# Patient Record
Sex: FEMALE | Race: WHITE | NOT HISPANIC OR LATINO | Employment: OTHER | URBAN - METROPOLITAN AREA
[De-identification: names, ages, dates, MRNs, and addresses within clinical notes are randomized per-mention and may not be internally consistent; named-entity substitution may affect disease eponyms.]

---

## 2021-05-31 ENCOUNTER — RECORDS - HEALTHEAST (OUTPATIENT)
Dept: ADMINISTRATIVE | Facility: CLINIC | Age: 86
End: 2021-05-31

## 2021-06-01 ENCOUNTER — RECORDS - HEALTHEAST (OUTPATIENT)
Dept: ADMINISTRATIVE | Facility: CLINIC | Age: 86
End: 2021-06-01

## 2022-05-16 ENCOUNTER — TRANSCRIBE ORDERS (OUTPATIENT)
Dept: OTHER | Age: 87
End: 2022-05-16
Payer: COMMERCIAL

## 2022-05-16 DIAGNOSIS — R13.19 ESOPHAGEAL DYSPHAGIA: Primary | ICD-10-CM

## 2022-05-19 ENCOUNTER — HOSPITAL ENCOUNTER (OUTPATIENT)
Dept: RADIOLOGY | Facility: HOSPITAL | Age: 87
Discharge: HOME | End: 2022-05-19
Attending: INTERNAL MEDICINE
Payer: COMMERCIAL

## 2022-05-19 ENCOUNTER — HOSPITAL ENCOUNTER (OUTPATIENT)
Dept: SPEECH THERAPY | Facility: HOSPITAL | Age: 87
Setting detail: THERAPIES SERIES
Discharge: HOME | End: 2022-05-19
Attending: INTERNAL MEDICINE
Payer: COMMERCIAL

## 2022-05-19 DIAGNOSIS — R13.19 ESOPHAGEAL DYSPHAGIA: ICD-10-CM

## 2022-05-19 PROCEDURE — 74220 X-RAY XM ESOPHAGUS 1CNTRST: CPT

## 2022-05-19 PROCEDURE — 74230 X-RAY XM SWLNG FUNCJ C+: CPT

## 2022-05-19 PROCEDURE — 255N000001 HC RX 255: Performed by: INTERNAL MEDICINE

## 2022-05-19 PROCEDURE — 92611 MOTION FLUOROSCOPY/SWALLOW: CPT | Mod: GN

## 2022-05-19 RX ADMIN — ANTACID/ANTIFLATULENT 4 G: 380; 550; 10; 10 GRANULE, EFFERVESCENT ORAL at 10:09

## 2022-05-19 NOTE — PROGRESS NOTES
05/19/22 0930   General Information   Type Of Visit Initial   Start Of Care Date 05/19/22   Referring Physician Dr Mora   Orders Evaluate And Treat   Medical Diagnosis esophageal dysphagia   Onset Of Illness/injury Or Date Of Surgery 04/20/22   Pertinent History of Current Problem/OT: Additional Occupational Profile Info Hospitalization in April due to good stuck in her throat. An EGD was completed on 4/21/22 with a piece of chicken removed. 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 Patient reports occasionally food feels like it gets stuck and she might have to vomit to bring it back up.   Patient/family Goals to find out what is wrong   General Information Comments Patient is alert, oriented.   Abuse Screen (yes response referral indicated)   Physical Signs of Abuse Present no   VFSS Evaluation   VFSS Additional Documentation Yes   VFSS Eval: Radiology   Radiologist Dr Pereira   Views Taken left lateral   Physical Location of Procedure [facility]   VFSS Eval: Thin Liquid Texture Trial   Mode of Presentation, Thin Liquid cup   Preparatory Phase WFL   Oral Phase, Thin Liquid WFL   Pharyngeal Phase, Thin Liquid WFL   Rosenbek's Penetration Aspiration Scale: Thin Liquid Trial Results 1 - no aspiration, contrast does not enter airway   Diagnostic Statement no oral pharyngeal dysphagia   VFSS Eval: Regular Texture Trial (Solid)   Mode of Presentation self-fed   Preparatory Phase WFL   Oral Phase WFL   Pharyngeal Phase WFL   Rosenbek's Penetration Aspiration Scale 1 - no aspiration, contrast does not enter airway   Diagnostic Statement no oral pharyngeal dysphagia   Esophageal Phase of Swallow   Patient reports or presents with symptoms of esophageal dysphagia Yes   Esophageal comments see Radiologists esophagram report   Swallow Eval: Clinical Impressions   Skilled Criteria for Therapy Intervention No problems identified which require skilled intervention   Clinical Impression Comments Bolus  manipulation and control is WFL. Mastication is complete.  Anterior-Posterior bolus transit is intact.  There is no oral stasis with any texture trialed.  There is full tongue back retraction to meet the posterior pharyngeal wall.  Swallow response is timely.  There is full hyolaryngeal elevation and excursion.   Epiglottic inversion is complete.   Pharyngeal constriction is intact.  There is no pharyngeal stasis, no residual in the valleculae, pyriform sinuses, or posterior pharyngeal wall.  There is no laryngeal penetration or aspiration.   Boluses move through PES without difficulty. See Radiologists esophagram report for further details.   Total Session Time   SLP Eval: VideoFluoroscopic Swallow function Minutes (69902) 15   Total Evaluation Time 15